# Patient Record
Sex: FEMALE | Race: WHITE
[De-identification: names, ages, dates, MRNs, and addresses within clinical notes are randomized per-mention and may not be internally consistent; named-entity substitution may affect disease eponyms.]

---

## 2017-02-08 ENCOUNTER — HOSPITAL ENCOUNTER (OUTPATIENT)
Dept: HOSPITAL 62 - OD | Age: 72
End: 2017-02-08
Attending: SPECIALIST
Payer: MEDICARE

## 2017-02-08 DIAGNOSIS — D50.9: ICD-10-CM

## 2017-02-08 DIAGNOSIS — R74.0: ICD-10-CM

## 2017-02-08 DIAGNOSIS — R10.9: Primary | ICD-10-CM

## 2017-02-08 LAB
ALBUMIN SERPL-MCNC: 4.1 G/DL (ref 3.5–5)
ALP SERPL-CCNC: 73 U/L (ref 38–126)
ALT SERPL-CCNC: 37 U/L (ref 9–52)
AMYLASE SERPL-CCNC: 56 U/L (ref 30–110)
ANION GAP SERPL CALC-SCNC: 11 MMOL/L (ref 5–19)
AST SERPL-CCNC: 24 U/L (ref 14–36)
BASOPHILS # BLD AUTO: 0 10^3/UL (ref 0–0.2)
BASOPHILS NFR BLD AUTO: 0.5 % (ref 0–2)
BILIRUB DIRECT SERPL-MCNC: 0 MG/DL (ref 0–0.3)
BILIRUB SERPL-MCNC: 0.7 MG/DL (ref 0.2–1.3)
BUN SERPL-MCNC: 13 MG/DL (ref 7–20)
CALCIUM: 9.9 MG/DL (ref 8.4–10.2)
CHLORIDE SERPL-SCNC: 102 MMOL/L (ref 98–107)
CO2 SERPL-SCNC: 28 MMOL/L (ref 22–30)
CREAT SERPL-MCNC: 0.86 MG/DL (ref 0.52–1.25)
EOSINOPHIL # BLD AUTO: 0.1 10^3/UL (ref 0–0.6)
EOSINOPHIL NFR BLD AUTO: 1.3 % (ref 0–6)
ERYTHROCYTE [DISTWIDTH] IN BLOOD BY AUTOMATED COUNT: 13.3 % (ref 11.5–14)
GLUCOSE SERPL-MCNC: 93 MG/DL (ref 75–110)
HCT VFR BLD CALC: 37.8 % (ref 36–47)
HGB BLD-MCNC: 13 G/DL (ref 12–15.5)
HGB HCT DIFFERENCE: 1.2
LIPASE SERPL-CCNC: 114.5 U/L (ref 23–300)
LYMPHOCYTES # BLD AUTO: 1.5 10^3/UL (ref 0.5–4.7)
LYMPHOCYTES NFR BLD AUTO: 24 % (ref 13–45)
MCH RBC QN AUTO: 30.2 PG (ref 27–33.4)
MCHC RBC AUTO-ENTMCNC: 34.5 G/DL (ref 32–36)
MCV RBC AUTO: 87 FL (ref 80–97)
MONOCYTES # BLD AUTO: 0.4 10^3/UL (ref 0.1–1.4)
MONOCYTES NFR BLD AUTO: 6.9 % (ref 3–13)
NEUTROPHILS # BLD AUTO: 4.1 10^3/UL (ref 1.7–8.2)
NEUTS SEG NFR BLD AUTO: 67.3 % (ref 42–78)
POTASSIUM SERPL-SCNC: 4.3 MMOL/L (ref 3.6–5)
PROT SERPL-MCNC: 6.9 G/DL (ref 6.3–8.2)
RBC # BLD AUTO: 4.32 10^6/UL (ref 3.72–5.28)
SODIUM SERPL-SCNC: 141.2 MMOL/L (ref 137–145)
WBC # BLD AUTO: 6.1 10^3/UL (ref 4–10.5)

## 2017-02-08 PROCEDURE — 83690 ASSAY OF LIPASE: CPT

## 2017-02-08 PROCEDURE — 36415 COLL VENOUS BLD VENIPUNCTURE: CPT

## 2017-02-08 PROCEDURE — 82150 ASSAY OF AMYLASE: CPT

## 2017-02-08 PROCEDURE — 80053 COMPREHEN METABOLIC PANEL: CPT

## 2017-02-08 PROCEDURE — 85025 COMPLETE CBC W/AUTO DIFF WBC: CPT

## 2017-02-09 NOTE — HISTORY AND PHYSICAL E
History and Physical



NAME: REGIS FREEMAN

MRN:  [id number]             : 1945   AGE: 71Y

ADMITTED: 2017                    ROOM:

 

HISTORY OF PRESENT ILLNESS:

The patient presented at this time regarding upper scope.  She does have

some nausea, abdominal discomfort.  I saw the patient in  where she

did have colonoscopy for diarrhea.  She does have small polyps and spasm

in the sigmoid colon.  Patient did have upper scope in the year  where

she did have esophagitis, gastritis.  No ulcer.  Small hiatus hernia.  The

patient did have benign chronic inflamed small bowel but no definite

blunting.  No definite blunting.  There is some nondiagnostic microphage

presented.  Whipple disease, _______.  Again, the patient was biopsied for

duodenum.  She is negative H. pylori.  The patient did have another upper

scope in  showing esophagitis, gastritis, duodenitis.  Consideration

for celiac disease.  Small bowel biopsy shows chronic inflammation.  No

evidence of Whipple disease or celiac disease.



REVIEW OF SYSTEMS:

Hypertension.  She did have left mastectomy.  Her father  of old age

at 82.  Her mom  with CA of the breast.  The patient did have hiatus

hernia.  She did have another colonoscopy in  showing polyps, adenoma

polyp in the cecum.  Repeat scope in  shows small polyps removed by

biopsy, tiny polyp.  The patient presented at this time for upper

endoscopy.



PHYSICAL EXAMINATION:

VITAL SIGNS:  Blood pressure 100/60, pulse 80, respirations 20, temp is

98.



HEAD, EYES, EARS, NOSE, THROAT:  Normal.



ABDOMEN:  Soft.



NEUROLOGIC:  Negative.



CONCLUSION:

Nausea and abdominal pain.



PLAN:

Gallbladder ultrasound and upper endoscopy.













DICTATING PHYSICIAN: EVELYNE STEELE M.D.





1211M                  DT: 2017    1444

PHY#: [id number]            DD: 2017    1431

ID:   [id number]           JOB#: [id number]       ACCT: [id number]



cc:CHANG ADAN M.D., MAHMOUD M.D.

>

## 2017-02-14 ENCOUNTER — HOSPITAL ENCOUNTER (OUTPATIENT)
Dept: HOSPITAL 62 - END | Age: 72
Discharge: HOME | End: 2017-02-14
Attending: SPECIALIST
Payer: MEDICARE

## 2017-02-14 VITALS — DIASTOLIC BLOOD PRESSURE: 56 MMHG | SYSTOLIC BLOOD PRESSURE: 99 MMHG

## 2017-02-14 DIAGNOSIS — K29.80: ICD-10-CM

## 2017-02-14 DIAGNOSIS — K31.9: ICD-10-CM

## 2017-02-14 DIAGNOSIS — K29.50: ICD-10-CM

## 2017-02-14 DIAGNOSIS — K31.7: Primary | ICD-10-CM

## 2017-02-14 DIAGNOSIS — K44.9: ICD-10-CM

## 2017-02-14 PROCEDURE — 88342 IMHCHEM/IMCYTCHM 1ST ANTB: CPT

## 2017-02-14 PROCEDURE — 88305 TISSUE EXAM BY PATHOLOGIST: CPT

## 2017-02-14 PROCEDURE — 43239 EGD BIOPSY SINGLE/MULTIPLE: CPT

## 2017-02-14 PROCEDURE — 0DB98ZX EXCISION OF DUODENUM, VIA NATURAL OR ARTIFICIAL OPENING ENDOSCOPIC, DIAGNOSTIC: ICD-10-PCS | Performed by: SPECIALIST

## 2017-02-14 PROCEDURE — 0DB68ZX EXCISION OF STOMACH, VIA NATURAL OR ARTIFICIAL OPENING ENDOSCOPIC, DIAGNOSTIC: ICD-10-PCS | Performed by: SPECIALIST

## 2017-02-14 RX ADMIN — MIDAZOLAM HYDROCHLORIDE ONE MG: 1 INJECTION, SOLUTION INTRAMUSCULAR; INTRAVENOUS at 08:33

## 2017-02-14 RX ADMIN — MIDAZOLAM HYDROCHLORIDE ONE MG: 1 INJECTION, SOLUTION INTRAMUSCULAR; INTRAVENOUS at 08:28

## 2017-02-14 NOTE — OPERATIVE REPORT E
Operative Report



NAME: REGIS FREEMAN

MRN:  [id number]          : 1945 AGE:  71Y

DATE OF SURGERY: 2017              ROOM:



PREOPERATIVE DIAGNOSIS:

Nausea, vomiting, and abdominal pain.



POSTOPERATIVE DIAGNOSES:

1.  Hiatus hernia, mild.

2.  Mild esophagitis.

3.  Mild gastritis.

4.  Mild duodenitis.



PROCEDURE:

Esophagoscopy, gastroscopy, duodenoscopy with gastric and duodenal

biopsies.



SURGEON:

EVELYNE STEELE M.D.



ANESTHESIA:

Versed 3 mg and Fentanyl 50 mcg.



TISSUE REMOVED OR ALTERED:

Gastric biopsy and duodenal biopsy.



DESCRIPTION OF PROCEDURE:

The baby scope passed under guided vision with no difficulties. 

Esophagoscopy:  Junction at 37 cm, mild esophagitis, small hiatus hernia,

no stricture, no bleeding.  Gastroscopy:  Multiple benign gastric polyps

2-3 mm in size each in the fundus and the body of the stomach and

prepyloric mild gastritis.  Biopsy obtained for H. pylori.  Duodenoscopy: 

Duodenal bulb shows no ulcers, mild duodenitis, descending duodenum mild

duodenitis.  The patient tolerated the procedure well and was discharged

to her room in stable condition.



CONCLUSIONS:

1.  No ulcers, no bleeding, no malignancy.

2.  Mild esophagitis.

3.  Mild gastritis.

4.  Mild duodenitis.

5.  Benign gastric polyps.



DISCHARGE PLAN:

1.  Hold aspirin for a few days.

2.  Soft diet.

3.  Awaiting gallbladder ultrasound.

4.  Continue present management.

5.  Continue omeprazole; increase it to twice daily.

6.  Awaiting biopsy results.





DICTATING PHYSICIAN:  EVELYNE STEELE M.D.





1209M                  DT: [id number]

[facility]#: [id number]            DD: 2017    0856

ID:   [id number]           JOB#: [id number]       ACCT: [id number]



cc:[facility] CenterCHANG PUENTES M.D., MAHMOUD M.D.

>

## 2017-02-14 NOTE — DISCHARGE SUMMARY E
Discharge Summary



NAME: REGIS FREEMAN

MRN:  [id number]        : 1945     AGE: 71Y

ADMITTED: 2017                 DISCHARGED: 2017



PROCEDURE:

EGD and biopsy.



HISTORY:

This 71-year-old female known to me had recent colonoscopy.  She presented

with nausea and vomiting.  Upper scope showed no ulcers, no malignancy,

and benign-looking gastric polyps with mild esophagitis, gastritis and

duodenitis.



DISCHARGE PLAN:

1.  Continue omeprazole.

2.  Hold aspirin and nonsteroidals for a few days pending biopsy results.

3.  Follow-up office visit in the next few days.

4.  Awaiting gallbladder ultrasound.  Verbal report by the patient shows

no stones and no sludge.

5.  Awaiting labs.





DICTATING PHYSICIAN:  EVELYNE STEELE M.D.





1209M                  DT: 2017    0908

[facility]#: [id number]            DD: 2017    0858

ID:   [id number]           JOB#: [id number]       ACCT: [id number]



cc:CHANG ADAN M.D., MAHMOUD M.D.

>

## 2018-01-25 ENCOUNTER — HOSPITAL ENCOUNTER (OUTPATIENT)
Dept: HOSPITAL 62 - WI | Age: 73
End: 2018-01-25
Attending: INTERNAL MEDICINE
Payer: MEDICARE

## 2018-01-25 DIAGNOSIS — M81.0: Primary | ICD-10-CM

## 2018-01-25 PROCEDURE — 77080 DXA BONE DENSITY AXIAL: CPT

## 2018-01-25 NOTE — WOMENS IMAGING REPORT
EXAM DESCRIPTION:  BONE DENSITY HIP/SPINE



COMPLETED DATE/TIME:  1/25/2018 10:32 am



REASON FOR STUDY:  OSTEOPOROSIS M81.0  AGE-RELATED OSTEOPOROSIS W/O CURRENT PATHOLOGICAL FRAC



COMPARISON:   December 2015



TECHNIQUE:  Dual-Energy X-ray Absorptiometry (DEXA) of the AP Spine and Hip.



LIMITATIONS:  None.



FINDINGS:  LUMBAR SPINE:

The bone mineral density (BMD) measured from L1-L4 in the AP projection correlates with a T-score of 
-0.3, which is normal as defined by the World Health Organization.  -0.9% decrease as compared to the
 previous study

HIP:

The bone mineral density (BMD) measured in the left hip correlates with a T-score of -0.7, which is n
ormal as defined by the World Health Organization.  -3.4% decrease as compared to the previous study



IMPRESSION:  1. LUMBAR SPINE: Normal

2.  HIP: Normal



COMMENT:  The World Health Organization defines low BMD as follows:

T-score:

Normal:  Greater than -1.0

Osteopenia: Between -1.0 and -2.5

Osteoporosis:  Less than -2.5 without fractures

Established osteoporosis:  Less than -2.5 with fractures

In general, you may wish to consider:

Diagnosis          Treatment                     Follow-up DEXA

Normal BMD      Prevention                    2-3 years

Osteopenia       Prevention/Therapy        1-2 years

Osteoporosis     Therapy                        Yearly



TECHNICAL DOCUMENTATION:  JOB ID:  [id number]

 [employer]- All Rights Reserved